# Patient Record
Sex: FEMALE | Race: WHITE | Employment: FULL TIME | URBAN - METROPOLITAN AREA
[De-identification: names, ages, dates, MRNs, and addresses within clinical notes are randomized per-mention and may not be internally consistent; named-entity substitution may affect disease eponyms.]

---

## 2021-05-04 ENCOUNTER — HOSPITAL ENCOUNTER (OUTPATIENT)
Dept: PREADMISSION TESTING | Age: 66
Discharge: HOME | End: 2021-05-04
Payer: COMMERCIAL

## 2021-05-04 ENCOUNTER — TRANSCRIBE ORDER (OUTPATIENT)
Dept: REGISTRATION | Age: 66
End: 2021-05-04

## 2021-05-04 DIAGNOSIS — D49.4 BLADDER NEOPLASM: ICD-10-CM

## 2021-05-04 DIAGNOSIS — D49.4 BLADDER NEOPLASM: Primary | ICD-10-CM

## 2021-05-04 LAB
ANION GAP SERPL CALC-SCNC: 6 MMOL/L (ref 3–18)
ATRIAL RATE: 68 BPM
BUN SERPL-MCNC: 18 MG/DL (ref 7–18)
BUN/CREAT SERPL: 23 (ref 12–20)
CALCIUM SERPL-MCNC: 9.4 MG/DL (ref 8.5–10.1)
CALCULATED P AXIS, ECG09: 80 DEGREES
CALCULATED R AXIS, ECG10: 68 DEGREES
CALCULATED T AXIS, ECG11: 61 DEGREES
CHLORIDE SERPL-SCNC: 110 MMOL/L (ref 100–111)
CO2 SERPL-SCNC: 26 MMOL/L (ref 21–32)
CREAT SERPL-MCNC: 0.79 MG/DL (ref 0.6–1.3)
DIAGNOSIS, 93000: NORMAL
ERYTHROCYTE [DISTWIDTH] IN BLOOD BY AUTOMATED COUNT: 12.4 % (ref 11.6–14.5)
GLUCOSE SERPL-MCNC: 100 MG/DL (ref 74–99)
HCT VFR BLD AUTO: 38.5 % (ref 35–45)
HGB BLD-MCNC: 12.9 G/DL (ref 12–16)
MCH RBC QN AUTO: 30.9 PG (ref 24–34)
MCHC RBC AUTO-ENTMCNC: 33.5 G/DL (ref 31–37)
MCV RBC AUTO: 92.3 FL (ref 74–97)
P-R INTERVAL, ECG05: 174 MS
PLATELET # BLD AUTO: 229 K/UL (ref 135–420)
PMV BLD AUTO: 9.3 FL (ref 9.2–11.8)
POTASSIUM SERPL-SCNC: 4.6 MMOL/L (ref 3.5–5.5)
Q-T INTERVAL, ECG07: 400 MS
QRS DURATION, ECG06: 86 MS
QTC CALCULATION (BEZET), ECG08: 425 MS
RBC # BLD AUTO: 4.17 M/UL (ref 4.2–5.3)
SODIUM SERPL-SCNC: 142 MMOL/L (ref 136–145)
VENTRICULAR RATE, ECG03: 68 BPM
WBC # BLD AUTO: 6.3 K/UL (ref 4.6–13.2)

## 2021-05-04 PROCEDURE — 80048 BASIC METABOLIC PNL TOTAL CA: CPT

## 2021-05-04 PROCEDURE — 93005 ELECTROCARDIOGRAM TRACING: CPT

## 2021-05-04 PROCEDURE — 85027 COMPLETE CBC AUTOMATED: CPT

## 2021-05-04 PROCEDURE — 36415 COLL VENOUS BLD VENIPUNCTURE: CPT

## 2021-05-07 ENCOUNTER — HOSPITAL ENCOUNTER (OUTPATIENT)
Dept: PREADMISSION TESTING | Age: 66
Discharge: HOME | End: 2021-05-07
Payer: COMMERCIAL

## 2021-05-07 PROCEDURE — U0005 INFEC AGEN DETEC AMPLI PROBE: HCPCS

## 2021-05-08 LAB — SARS-COV-2, COV2NT: NOT DETECTED

## 2021-05-12 ENCOUNTER — ANESTHESIA EVENT (OUTPATIENT)
Dept: SURGERY | Age: 66
End: 2021-05-12
Payer: COMMERCIAL

## 2021-05-13 ENCOUNTER — HOSPITAL ENCOUNTER (OUTPATIENT)
Age: 66
Setting detail: OBSERVATION
Discharge: HOME | End: 2021-05-14
Attending: UROLOGY | Admitting: UROLOGY
Payer: COMMERCIAL

## 2021-05-13 ENCOUNTER — ANESTHESIA (OUTPATIENT)
Dept: SURGERY | Age: 66
End: 2021-05-13
Payer: COMMERCIAL

## 2021-05-13 DIAGNOSIS — Z98.890 S/P BLADDER TUMOR EXCISION WITH FULGURATION: Primary | ICD-10-CM

## 2021-05-13 PROBLEM — R31.0 GROSS HEMATURIA: Status: ACTIVE | Noted: 2021-05-13

## 2021-05-13 PROCEDURE — 74011250636 HC RX REV CODE- 250/636: Performed by: UROLOGY

## 2021-05-13 PROCEDURE — 77030020782 HC GWN BAIR PAWS FLX 3M -B: Performed by: UROLOGY

## 2021-05-13 PROCEDURE — 77010033678 HC OXYGEN DAILY

## 2021-05-13 PROCEDURE — 74011000250 HC RX REV CODE- 250: Performed by: UROLOGY

## 2021-05-13 PROCEDURE — 76210000016 HC OR PH I REC 1 TO 1.5 HR: Performed by: UROLOGY

## 2021-05-13 PROCEDURE — 99218 HC RM OBSERVATION: CPT

## 2021-05-13 PROCEDURE — 74011000258 HC RX REV CODE- 258: Performed by: UROLOGY

## 2021-05-13 PROCEDURE — 77030005546 HC CATH URETH FOL 3W BARD -A: Performed by: UROLOGY

## 2021-05-13 PROCEDURE — 74011250636 HC RX REV CODE- 250/636: Performed by: NURSE ANESTHETIST, CERTIFIED REGISTERED

## 2021-05-13 PROCEDURE — 77030012508 HC MSK AIRWY LMA AMBU -A: Performed by: ANESTHESIOLOGY

## 2021-05-13 PROCEDURE — 2709999900 HC NON-CHARGEABLE SUPPLY: Performed by: UROLOGY

## 2021-05-13 PROCEDURE — 77030020268 HC MISC GENERAL SUPPLY: Performed by: UROLOGY

## 2021-05-13 PROCEDURE — 74011000254 HC RX REV CODE- 254: Performed by: NURSE ANESTHETIST, CERTIFIED REGISTERED

## 2021-05-13 PROCEDURE — 88305 TISSUE EXAM BY PATHOLOGIST: CPT

## 2021-05-13 PROCEDURE — 74011000250 HC RX REV CODE- 250: Performed by: ANESTHESIOLOGY

## 2021-05-13 PROCEDURE — 74011250637 HC RX REV CODE- 250/637: Performed by: UROLOGY

## 2021-05-13 PROCEDURE — 76060000033 HC ANESTHESIA 1 TO 1.5 HR: Performed by: UROLOGY

## 2021-05-13 PROCEDURE — 77030040361 HC SLV COMPR DVT MDII -B: Performed by: UROLOGY

## 2021-05-13 PROCEDURE — 88307 TISSUE EXAM BY PATHOLOGIST: CPT

## 2021-05-13 PROCEDURE — 74011000250 HC RX REV CODE- 250: Performed by: NURSE ANESTHETIST, CERTIFIED REGISTERED

## 2021-05-13 PROCEDURE — 76010000149 HC OR TIME 1 TO 1.5 HR: Performed by: UROLOGY

## 2021-05-13 RX ORDER — PROPOFOL 10 MG/ML
INJECTION, EMULSION INTRAVENOUS AS NEEDED
Status: DISCONTINUED | OUTPATIENT
Start: 2021-05-13 | End: 2021-05-13 | Stop reason: HOSPADM

## 2021-05-13 RX ORDER — ATROPA BELLADONNA AND OPIUM 16.2; 3 MG/1; MG/1
SUPPOSITORY RECTAL AS NEEDED
Status: DISCONTINUED | OUTPATIENT
Start: 2021-05-13 | End: 2021-05-13 | Stop reason: HOSPADM

## 2021-05-13 RX ORDER — HYDROMORPHONE HYDROCHLORIDE 1 MG/ML
0.5 INJECTION, SOLUTION INTRAMUSCULAR; INTRAVENOUS; SUBCUTANEOUS
Status: DISCONTINUED | OUTPATIENT
Start: 2021-05-13 | End: 2021-05-13 | Stop reason: HOSPADM

## 2021-05-13 RX ORDER — FUROSEMIDE 10 MG/ML
INJECTION INTRAMUSCULAR; INTRAVENOUS AS NEEDED
Status: DISCONTINUED | OUTPATIENT
Start: 2021-05-13 | End: 2021-05-13 | Stop reason: HOSPADM

## 2021-05-13 RX ORDER — OXYCODONE AND ACETAMINOPHEN 5; 325 MG/1; MG/1
1 TABLET ORAL AS NEEDED
Status: DISCONTINUED | OUTPATIENT
Start: 2021-05-13 | End: 2021-05-13 | Stop reason: HOSPADM

## 2021-05-13 RX ORDER — DEXAMETHASONE SODIUM PHOSPHATE 4 MG/ML
INJECTION, SOLUTION INTRA-ARTICULAR; INTRALESIONAL; INTRAMUSCULAR; INTRAVENOUS; SOFT TISSUE AS NEEDED
Status: DISCONTINUED | OUTPATIENT
Start: 2021-05-13 | End: 2021-05-13 | Stop reason: HOSPADM

## 2021-05-13 RX ORDER — SODIUM CHLORIDE, SODIUM LACTATE, POTASSIUM CHLORIDE, CALCIUM CHLORIDE 600; 310; 30; 20 MG/100ML; MG/100ML; MG/100ML; MG/100ML
1000 INJECTION, SOLUTION INTRAVENOUS CONTINUOUS
Status: DISCONTINUED | OUTPATIENT
Start: 2021-05-13 | End: 2021-05-13 | Stop reason: HOSPADM

## 2021-05-13 RX ORDER — DIPHENHYDRAMINE HYDROCHLORIDE 50 MG/ML
12.5 INJECTION, SOLUTION INTRAMUSCULAR; INTRAVENOUS
Status: DISCONTINUED | OUTPATIENT
Start: 2021-05-13 | End: 2021-05-13 | Stop reason: HOSPADM

## 2021-05-13 RX ORDER — LIDOCAINE HYDROCHLORIDE 20 MG/ML
INJECTION, SOLUTION EPIDURAL; INFILTRATION; INTRACAUDAL; PERINEURAL AS NEEDED
Status: DISCONTINUED | OUTPATIENT
Start: 2021-05-13 | End: 2021-05-13 | Stop reason: HOSPADM

## 2021-05-13 RX ORDER — HYDROMORPHONE HYDROCHLORIDE 1 MG/ML
INJECTION, SOLUTION INTRAMUSCULAR; INTRAVENOUS; SUBCUTANEOUS AS NEEDED
Status: DISCONTINUED | OUTPATIENT
Start: 2021-05-13 | End: 2021-05-13 | Stop reason: HOSPADM

## 2021-05-13 RX ORDER — ATROPA BELLADONNA AND OPIUM 16.2; 3 MG/1; MG/1
1 SUPPOSITORY RECTAL
Status: DISCONTINUED | OUTPATIENT
Start: 2021-05-13 | End: 2021-05-14 | Stop reason: HOSPADM

## 2021-05-13 RX ORDER — SODIUM CHLORIDE 0.9 % (FLUSH) 0.9 %
5-40 SYRINGE (ML) INJECTION EVERY 8 HOURS
Status: DISCONTINUED | OUTPATIENT
Start: 2021-05-13 | End: 2021-05-14 | Stop reason: HOSPADM

## 2021-05-13 RX ORDER — LABETALOL HYDROCHLORIDE 5 MG/ML
10 INJECTION, SOLUTION INTRAVENOUS ONCE
Status: COMPLETED | OUTPATIENT
Start: 2021-05-13 | End: 2021-05-13

## 2021-05-13 RX ORDER — MIDAZOLAM HYDROCHLORIDE 1 MG/ML
INJECTION, SOLUTION INTRAMUSCULAR; INTRAVENOUS AS NEEDED
Status: DISCONTINUED | OUTPATIENT
Start: 2021-05-13 | End: 2021-05-13 | Stop reason: HOSPADM

## 2021-05-13 RX ORDER — HYDROCODONE BITARTRATE AND ACETAMINOPHEN 5; 325 MG/1; MG/1
1 TABLET ORAL
Status: DISCONTINUED | OUTPATIENT
Start: 2021-05-13 | End: 2021-05-14 | Stop reason: HOSPADM

## 2021-05-13 RX ORDER — ONDANSETRON 2 MG/ML
INJECTION INTRAMUSCULAR; INTRAVENOUS AS NEEDED
Status: DISCONTINUED | OUTPATIENT
Start: 2021-05-13 | End: 2021-05-13 | Stop reason: HOSPADM

## 2021-05-13 RX ORDER — NALOXONE HYDROCHLORIDE 0.4 MG/ML
0.2 INJECTION, SOLUTION INTRAMUSCULAR; INTRAVENOUS; SUBCUTANEOUS AS NEEDED
Status: DISCONTINUED | OUTPATIENT
Start: 2021-05-13 | End: 2021-05-13 | Stop reason: HOSPADM

## 2021-05-13 RX ORDER — FENTANYL CITRATE 50 UG/ML
INJECTION, SOLUTION INTRAMUSCULAR; INTRAVENOUS AS NEEDED
Status: DISCONTINUED | OUTPATIENT
Start: 2021-05-13 | End: 2021-05-13 | Stop reason: HOSPADM

## 2021-05-13 RX ORDER — CITALOPRAM 20 MG/1
40 TABLET, FILM COATED ORAL EVERY EVENING
Status: DISCONTINUED | OUTPATIENT
Start: 2021-05-13 | End: 2021-05-14 | Stop reason: HOSPADM

## 2021-05-13 RX ORDER — ALBUTEROL SULFATE 0.83 MG/ML
2.5 SOLUTION RESPIRATORY (INHALATION) AS NEEDED
Status: DISCONTINUED | OUTPATIENT
Start: 2021-05-13 | End: 2021-05-13 | Stop reason: HOSPADM

## 2021-05-13 RX ORDER — LEVOFLOXACIN 500 MG/1
500 TABLET, FILM COATED ORAL
Status: DISCONTINUED | OUTPATIENT
Start: 2021-05-14 | End: 2021-05-14 | Stop reason: HOSPADM

## 2021-05-13 RX ORDER — DEXTROSE MONOHYDRATE AND SODIUM CHLORIDE 5; .45 G/100ML; G/100ML
75 INJECTION, SOLUTION INTRAVENOUS CONTINUOUS
Status: DISCONTINUED | OUTPATIENT
Start: 2021-05-13 | End: 2021-05-14 | Stop reason: HOSPADM

## 2021-05-13 RX ORDER — ESMOLOL HYDROCHLORIDE 10 MG/ML
INJECTION INTRAVENOUS AS NEEDED
Status: DISCONTINUED | OUTPATIENT
Start: 2021-05-13 | End: 2021-05-13 | Stop reason: HOSPADM

## 2021-05-13 RX ORDER — FENTANYL CITRATE 50 UG/ML
25 INJECTION, SOLUTION INTRAMUSCULAR; INTRAVENOUS AS NEEDED
Status: DISCONTINUED | OUTPATIENT
Start: 2021-05-13 | End: 2021-05-13 | Stop reason: HOSPADM

## 2021-05-13 RX ORDER — LEVOFLOXACIN 5 MG/ML
500 INJECTION, SOLUTION INTRAVENOUS ONCE
Status: COMPLETED | OUTPATIENT
Start: 2021-05-13 | End: 2021-05-13

## 2021-05-13 RX ORDER — GLYCOPYRROLATE 0.2 MG/ML
INJECTION INTRAMUSCULAR; INTRAVENOUS AS NEEDED
Status: DISCONTINUED | OUTPATIENT
Start: 2021-05-13 | End: 2021-05-13 | Stop reason: HOSPADM

## 2021-05-13 RX ORDER — FLUMAZENIL 0.1 MG/ML
0.2 INJECTION INTRAVENOUS
Status: DISCONTINUED | OUTPATIENT
Start: 2021-05-13 | End: 2021-05-13 | Stop reason: HOSPADM

## 2021-05-13 RX ORDER — SODIUM CHLORIDE, SODIUM LACTATE, POTASSIUM CHLORIDE, CALCIUM CHLORIDE 600; 310; 30; 20 MG/100ML; MG/100ML; MG/100ML; MG/100ML
125 INJECTION, SOLUTION INTRAVENOUS CONTINUOUS
Status: DISCONTINUED | OUTPATIENT
Start: 2021-05-13 | End: 2021-05-14 | Stop reason: HOSPADM

## 2021-05-13 RX ORDER — SODIUM CHLORIDE 0.9 % (FLUSH) 0.9 %
5-40 SYRINGE (ML) INJECTION AS NEEDED
Status: DISCONTINUED | OUTPATIENT
Start: 2021-05-13 | End: 2021-05-14 | Stop reason: HOSPADM

## 2021-05-13 RX ORDER — ROCURONIUM BROMIDE 10 MG/ML
INJECTION, SOLUTION INTRAVENOUS AS NEEDED
Status: DISCONTINUED | OUTPATIENT
Start: 2021-05-13 | End: 2021-05-13 | Stop reason: HOSPADM

## 2021-05-13 RX ADMIN — SODIUM CHLORIDE, SODIUM LACTATE, POTASSIUM CHLORIDE, AND CALCIUM CHLORIDE: 600; 310; 30; 20 INJECTION, SOLUTION INTRAVENOUS at 09:40

## 2021-05-13 RX ADMIN — HYDROCODONE BITARTRATE AND ACETAMINOPHEN 1 TABLET: 5; 325 TABLET ORAL at 16:01

## 2021-05-13 RX ADMIN — HYDROMORPHONE HYDROCHLORIDE 0.5 MG: 1 INJECTION, SOLUTION INTRAMUSCULAR; INTRAVENOUS; SUBCUTANEOUS at 09:09

## 2021-05-13 RX ADMIN — ROCURONIUM BROMIDE 15 MG: 10 INJECTION, SOLUTION INTRAVENOUS at 09:09

## 2021-05-13 RX ADMIN — GEMCITABINE 1000 MG: 38 INJECTION, SOLUTION INTRAVENOUS at 09:42

## 2021-05-13 RX ADMIN — LABETALOL HYDROCHLORIDE 10 MG: 5 INJECTION INTRAVENOUS at 10:59

## 2021-05-13 RX ADMIN — ESMOLOL HYDROCHLORIDE 30 MG: 10 INJECTION, SOLUTION INTRAVENOUS at 09:17

## 2021-05-13 RX ADMIN — SUGAMMADEX 125 MG: 100 INJECTION, SOLUTION INTRAVENOUS at 09:39

## 2021-05-13 RX ADMIN — ROCURONIUM BROMIDE 15 MG: 10 INJECTION, SOLUTION INTRAVENOUS at 09:16

## 2021-05-13 RX ADMIN — DEXTROSE MONOHYDRATE AND SODIUM CHLORIDE 75 ML/HR: 5; .45 INJECTION, SOLUTION INTRAVENOUS at 12:22

## 2021-05-13 RX ADMIN — GLYCOPYRROLATE 0.1 MG: 0.2 INJECTION INTRAMUSCULAR; INTRAVENOUS at 08:47

## 2021-05-13 RX ADMIN — ONDANSETRON HYDROCHLORIDE 4 MG: 2 INJECTION INTRAMUSCULAR; INTRAVENOUS at 08:55

## 2021-05-13 RX ADMIN — PROPOFOL 100 MG: 10 INJECTION, EMULSION INTRAVENOUS at 08:52

## 2021-05-13 RX ADMIN — FUROSEMIDE 10 MG: 10 INJECTION, SOLUTION INTRAVENOUS at 09:29

## 2021-05-13 RX ADMIN — LIDOCAINE HYDROCHLORIDE 100 MG: 20 INJECTION, SOLUTION EPIDURAL; INFILTRATION; INTRACAUDAL; PERINEURAL at 08:52

## 2021-05-13 RX ADMIN — FENTANYL CITRATE 100 MCG: 50 INJECTION, SOLUTION INTRAMUSCULAR; INTRAVENOUS at 09:14

## 2021-05-13 RX ADMIN — SODIUM CHLORIDE, SODIUM LACTATE, POTASSIUM CHLORIDE, AND CALCIUM CHLORIDE 125 ML/HR: 600; 310; 30; 20 INJECTION, SOLUTION INTRAVENOUS at 07:40

## 2021-05-13 RX ADMIN — INDIGO CARMINE 40 MG: 8 INJECTION, SOLUTION INTRAMUSCULAR; INTRAVENOUS at 09:09

## 2021-05-13 RX ADMIN — MIDAZOLAM 2 MG: 1 INJECTION INTRAMUSCULAR; INTRAVENOUS at 08:47

## 2021-05-13 RX ADMIN — DEXAMETHASONE SODIUM PHOSPHATE 8 MG: 4 INJECTION, SOLUTION INTRAMUSCULAR; INTRAVENOUS at 08:55

## 2021-05-13 RX ADMIN — LEVOFLOXACIN 500 MG: 5 INJECTION, SOLUTION INTRAVENOUS at 08:49

## 2021-05-13 RX ADMIN — CITALOPRAM HYDROBROMIDE 40 MG: 20 TABLET ORAL at 18:02

## 2021-05-13 RX ADMIN — HYDROMORPHONE HYDROCHLORIDE 0.5 MG: 1 INJECTION, SOLUTION INTRAMUSCULAR; INTRAVENOUS; SUBCUTANEOUS at 08:47

## 2021-05-13 NOTE — PROGRESS NOTES
2400 [facility] verifies that Dr Herve Chopra states pt did not need CBI at this time But wants to have bags ready in pt room just in case pt needs. [facility] care of pt at this time. Assessment complete. Pt alert and oriented x 4. Shows no sign of distress. Fall risk arm band in place. Denies SOB and chest pain. Pt lungs clear bilaterally. Cap refill  less than 3 seconds. Pt denies numbness and tingling to all extremities. Stated pain 0/10. Pt has 20 G IV to L hand. Pt has no dressing skin intact besides scratches to arm from dog . Lamb in place draining clear yellow urine free of clots. Pt complains of no discomfort except urge to void due to catheter. SCDS and TEDs applied to BLE. Incentive spirometer at bedside. Pt encouraged to continue use of IS. Pt verbalized understanding. Call light and possessions within reach. Bed locked and in low position. Will continue to monitor. 1437  Lamb bag emptied 350ml of clear yellow urine free from clots no blood noted. Pt still states she is \"doing well\" no pain. Nurse will continue to monitor     1601  Pt complains of Left flank pain states she had UTI in past. Nurse explained that pt will receive abx and if has not gotten any better will inform Dr Herve Chopra. Pt states pain is 5/10 medicated with 5mg of norco.  Urine remains clear yellow. No blood or clots noted    Shift summary  Pt is alert and oriented x 4. Pt had uneventful shift. Pt  voiding sufficient amounts of clear yellow urine via lamb. Denies discomfort or feeling of having bladder spasms. Pain to be controlled by PRN medication.

## 2021-05-13 NOTE — INTERVAL H&P NOTE
Update History & Physical    The Patient's History and Physical of April 20, 2021 was reviewed with the patient and I examined the patient. There was no change. The surgical site was confirmed by the patient and me. Plan:  The risk, benefits, expected outcome, and alternative to the recommended procedure have been discussed with the patient. Patient understands and wants to proceed with the procedure.     Electronically signed by Sofie Clemente MD on 5/13/2021 at 8:38 AM

## 2021-05-13 NOTE — ANESTHESIA POSTPROCEDURE EVALUATION
Post-Anesthesia Evaluation and Assessment    Cardiovascular Function/Vital Signs  Visit Vitals  BP (!) 154/66   Pulse 71   Temp 37.3 °C (99.1 °F)   Resp 20   Ht 5' 3.5\" (1.613 m)   Wt 62.6 kg (138 lb 1 oz)   SpO2 95%   BMI 24.07 kg/m²       Patient is status post Procedure(s):  CYSTOSCOPY TRANSURETHRAL RESECTION OF BLADDER TUMOR WITH GEMCITABINE. Nausea/Vomiting: Controlled. Postoperative hydration reviewed and adequate. Pain:  Pain Scale 1: FLACC (05/13/21 1113)  Pain Intensity 1: 0 (05/13/21 1113)   Managed. Neurological Status:   Neuro (WDL): Within Defined Limits (05/13/21 1030)   At baseline. Mental Status and Level of Consciousness: Baseline and appropriate for discharge. Pulmonary Status:   O2 Device: None (Room air) (05/13/21 1010)   Adequate oxygenation and airway patent. Complications related to anesthesia: None    Post-anesthesia assessment completed. No concerns. Patient has met all discharge requirements.     Signed By: An Pelaez MD    May 13, 2021

## 2021-05-13 NOTE — OP NOTES
Postoperative Note    Patient: Sander Cook  YOB: 1955  MRN: [id number]    Date of Procedure: 5/13/2021     Pre-Op Diagnosis: NEOPLASM UNSPECIFIED  BEHAVIOR OF BLADDER    Post-Op Diagnosis: Same as preoperative diagnosis. Procedure(s):  CYSTOSCOPY TRANSURETHRAL RESECTION OF BLADDER TUMOR LARGE WITH GEMCITABINE    Surgeon(s):  Cornel Angelucci, MD    Surgical Assistant: None    Anesthesia: General     Estimated Blood Loss (mL): Minimal    Complications: None    Specimens:   ID Type Source Tests Collected by Time Destination   1 : BLADDER TUMOR RESECTION Preservative Bladder  Cornel Angelucci, MD 5/13/2021 1154 Pathology        Implants: * No implants in log *    Drains: * No LDAs found *    Findings: Large tumor occupying left side of trigone extending to left sidewall, unable to identify the left ureteral orifice, mass palpable and mobile on preop exam under anesthesia    Procedure:    Patient was brought in the operating and placed in the supine position. After administration of general anesthesia she was placed in the lithotomy position. I began by performing a bimanual exam.  I was able to palpate mass on the left side of the bladder. The bladder was mobile. No other abnormalities were identified. I began with a 21 Sen[place]e cystoscope, using 30 degree and 70 degree lens the tumor was clearly identified on the left side of the bladder. I was unable to identify either ureteral orifice at this time due to some bleeding of the tumors. I removed the cystoscope and placed in a 26 Seneg[place]e resectoscope. Using the bipolar loop I began resecting the tumor on the left side of the trigone and laterally. I gave the patient indigo carmine to hopefully identify both ureteral orifice ease. I continued to resect the tumor down to muscle. I pinpoint cauterized during the entire procedure to prevent arising the ureteral orifice on the left side. I was able to see blue from the right ureteral orifice.   However once the case was completed I could not identify the ureteral orifice on the left side. At this point I was able to enter the bladder all the specimen was removed. There was no active bleeding. No residual tumor identified. I continue to see blue urine from the right ureteral orifice, no urine from the left side was identified. I placed a 22 Portuguese three-way Montalvo catheter into the bladder. The influx valve was plugged with a catheter plug. I then instilled 2 g of gemcitabine into the bladder. The bladder was then clamped. Patient was then extubated and transferred to recovery room in stable condition.

## 2021-05-13 NOTE — PERIOP NOTES
Reviewed PTA medication list with patient/caregiver and patient/caregiver denies any additional medications. Patient admits to having a responsible adult care for them at home for at least 24 hours after surgery. Patient encouraged to use gown warming system and informed that using said warming gown to regulate body temperature prior to a procedure has been shown to help reduce the risks of blood clots and infection. Patient's pharmacy of choice verified and documented in PTA medication section. Dual skin assessment & fall risk band verification completed with Isa FELDMAN.

## 2021-05-13 NOTE — ANESTHESIA PREPROCEDURE EVALUATION
Relevant Problems   No relevant active problems       Anesthetic History   No history of anesthetic complications            Review of Systems / Medical History  Patient summary reviewed, nursing notes reviewed and pertinent labs reviewed    Pulmonary          Smoker      Comments: 1/2 ppd x 15y; abstained today   Neuro/Psych   Within defined limits           Cardiovascular                  Exercise tolerance: >4 METS     GI/Hepatic/Renal  Within defined limits              Endo/Other        Arthritis     Other Findings              Physical Exam    Airway  Mallampati: II  TM Distance: 4 - 6 cm  Neck ROM: normal range of motion   Mouth opening: Normal     Cardiovascular               Dental  No notable dental hx       Pulmonary                 Abdominal         Other Findings            Anesthetic Plan    ASA: 2  Anesthesia type: general          Induction: Intravenous  Anesthetic plan and risks discussed with: Patient

## 2021-05-13 NOTE — PERIOP NOTES
TRANSFER - OUT REPORT:    Verbal report given to [facility]  RN (name) on Sissy Stone  being transferred to 2 S(unit) for routine progression of care       Report consisted of patients Situation, Background, Assessment and   Recommendations(SBAR). Information from the following report(s) SBAR, Kardex, OR Summary, Procedure Summary, Intake/Output and MAR was reviewed with the receiving nurse. Lines:   Peripheral IV 05/13/21 Left Hand (Active)   Site Assessment Clean, dry, & intact 05/13/21 0736   Phlebitis Assessment 0 05/13/21 0736   Infiltration Assessment 0 05/13/21 0736   Dressing Status Clean, dry, & intact 05/13/21 0736   Dressing Type Transparent;Tape 05/13/21 0736   Hub Color/Line Status Infusing;Patent;Pink 05/13/21 0736        Intake/Output Summary (Last 24 hours) at 5/13/2021 1138  Last data filed at 5/13/2021 1126  Gross per 24 hour   Intake 1600 ml   Output 675 ml   Net 925 ml         Opportunity for questions and clarification was provided.       Patient transported with:   O2 @ 2 liters  Registered Nurse

## 2021-05-13 NOTE — H&P
Urology [place] [id number]  Tel: [phone]  Fax: [phone]    Patient : Herminio Cochran   YOB: 1955   Birth Sex: Female      Current Gender: Female      Date:  04/20/2021 10:30 AM    Visit Type:   Office Visit   Assessment/Plan  # Detail Type Description    1. Assessment Gross hematuria (R31.0). Patient Plan Patient with history of gross hematuria at this time her hematuria appears to be due to the mass at the base of her bladder         2. Assessment Cigarette smoker (F17.210). Patient Plan Patient continues to smoke I strongly encouraged her to stop smoking as soon as possible since that will decrease the chance of recurrence disease of disease         3. 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 Patient Plan Patient underwent cystoscopy today. Findings revealed bladder mass. I reviewed these findings with the patient along with her CT scan and her urine cytology. At this time recommend she undergo cystoscopy transurethral resection of bladder tumor and instillation of gemcitabine. All risks including pain infection bleeding bladder injury recurrence of disease need for 2nd treatment on this tumor to the size were reviewed she understands will proceed         4. Assessment Personal history of kidney stones (B84.429). Patient Plan Patient with history of kidney stones was treated in the 1990s at [facility]. She underwent ESWL at that time with apparent resolution of stones              Additional Visit Information      This 76year old female presents for Hematuria. History of Present Illness  1. Hematuria   The patient presents with hematuria (gross with clots). The problem began suddenly and occurs every 3-6 months. Pertinent history includes being at least 35years of age, history of kidney stones and smoking. Denies aggravating factors. Denies relieving factors.  The patient denies chills, fever, nausea, pressure, urinary frequency, urgency and vomiting. Additional information: Pt with hx of Gross hematuria, this began in January. She was tx'ed w multiple antibiotics but still w hematuria. Pt w hx of stones in  had ESWL at [facility]. She is still a daily smoker. She is having some LEFT flank pain. 2021  Patient here today for follow-up she is here for cystoscopy to complete hematuria workup. Her urine cytology was positive for fish. Her CT scan revealed:  1. 3.6 x 4.4 x 2.5 cm enhancing mass consistent with primary urothelial neoplasm of the   urinary bladder. This is at the left posterolateral aspect of the urinary bladder and   involving the left ureterovesical junction with abnormal enhancing soft tissue at the   distal most left ureter also present. 2. The above is associated with marked chronic atrophy of the left kidney with extensive   multifocal chronic left renal cortical parenchymal scarring and regions of decreased   cortical enhancement. 3. Likely compensatory enlargement of the right kidney, however there are some regions of   right renal cortical parenchymal scarring. 4. Mild caliectasis at the lower pole of the right kidney without significant right   hydroureteronephrosis. This may be baseline for the patient. 5. Scattered small benign hepatic cysts. Past Medical/Surgical History   (Reviewed, updated)  Disease/disorder Onset Date Management Date Comments      x 3       kidney stent         Problem List  Problem List reviewed.    Problem Description Onset Date Chronic Clinical Status Notes   Hyperlipidemia 2014 Y     Psoriasis 2014 Y     Tobacco dependence syndrome 2014 Y     Depressive disorder 2014 Y     Infantile cerebral palsy 2014 Y       Medications (active prior to today)  Medication Instructions Start Date Stop Date Refilled Elsewhere   Celexa 40 mg tablet take 1 tablet by oral route  every day 2014   N triamcinolone acetonide 0.1 % topical cream apply by topical route 2 times every day a thin layer to the affected area(s) 2014  N       Medication Reconciliation  Medications reconciled today. Medication Reviewed  Adherence Medication Name Sig Desc Elsewhere Status   taking as directed Celexa 40 mg tablet take 1 tablet by oral route  every day N Verified     Allergies  Ingredient Reaction (Severity) Medication Name Comment   NO KNOWN ALLERGIES        Reviewed, no changes. Family History   (Reviewed, updated)    Relationship Family Member Name  Age at Death Condition Onset Age Cause of Death       Family history of Heart disease  N   Father  N  Myocardial infarction  N   Mother  N  Alzheimer's Disease  N   Family History Comments  Relationship Family Member Name Condition Comments   Father  Myocardial infarction    Mother  Alzheimer's Disease      Social History  (Reviewed, updated)  Tobacco use reviewed. Preferred language is [place]. Marital Status/Family/Social Support  Marital status:    Previously  one time. Children  Has children:    Tobacco use status: Moderate cigarette smoker (10-19 cigs/day). Smoking status: Heavy tobacco smoker. Tobacco Screening  Patient has used tobacco.     Smoking Status  Type Smoking Status Usage Per Day Years Used Pack Years Total Pack Years   Cigarette Heavy tobacco smoker 10 Cigarettes 30.00 15.00 15.00     Alcohol  There is a history of alcohol use. consumed rarely. Caffeine  The patient uses caffeine: coffee - 2 cups a day. Review of Systems  System Neg/Pos Details   Constitutional Negative Chills and Fever. ENMT Negative Ear infections and Sore throat. Eyes Negative Blurred vision, Double vision and Eye pain. Respiratory Negative Asthma, Chronic cough, Dyspnea and Wheezing. Cardio Negative Chest pain. GI Negative Constipation, Decreased appetite, Diarrhea, Nausea and Vomiting. Endocrine Negative Cold intolerance, Heat intolerance, Increased thirst and Weight loss. Neuro Negative Headache and Tremors. Psych Negative Anxiety and Depression. Integumentary Negative Itching skin and Rash. MS Negative Back pain and Joint pain. Hema/Lymph Negative Easy bleeding. Vital Signs   Height  Time ft in cm Last Measured Height Position   11:05 AM 5.0 3.50 161.29 04/06/2021 0     Weight/BSA/BMI  Time lb oz kg Context BMI kg/m2 BSA m2   11:05 .00  61.235  23.54      Measured by  Time Measured by   11:05 AM [facility]     Physical Exam  Exam Findings Details   Constitutional Normal Well developed. Neck Exam Normal Inspection - Normal.   Respiratory Normal Inspection - Normal.   Genitourinary Normal External genitalia - Normal. Glands - Normal.   Extremity Normal No Edema. Psychiatric Normal Orientation - Oriented to time, place, person & situation. Appropriate mood and affect. Cystoscopy indication  Bladder. Patient consent  Consent was obtained. The procedure and risks were explained in detail. Questions were encouraged and answered. The patient was prepped and draped in the usual sterile fashion. Procedure  A diagnostic cystourethroscopy was performed using a 16 Chilean flexible cystoscope    Anesthesia  No anesthesia. Patient position  Dorsal lithotomy. Patient response  Patient tolerated procedure well. Patient was given instructions. Patient was discharged in stable condition. Findings   The bladder has lesion/mass found on the bladder. The first lesion/mass is located Bladder base of the bladder with characteristics of papillary. Ureteral orifices normal in appearance. Antibiotics  No antibiotics given. Cipro    Impression  Gross hematuria R31.0.         Medications (added, continued, or stopped today)  Start Date Medication Directions PRN Status PRN Reason Instruction Stop Date   02/28/2014 Celexa 40 mg tablet take 1 tablet by oral route  every day N      02/28/2014 triamcinolone acetonide 0.1 % topical cream apply by topical route 2 times every day a thin layer to the affected area(s) N   04/20/2021       Active Patient Care Team Members  Name Contact Agency Type Support Role Relationship Active Date Inactive Date Specialty   Leeanna Spangler   Patient provider PCP      [place]   Emergency Contact Child, Mother is the Patient          Provider:    Jayjay Ball MD 04/24/2021 8:09 AM     Document generated by:  Rodger Brizuela 04/24/2021 08:09 AM      ----------------------------------------------------------------------------------------------------------------------------------------------------------------------      Electronically signed by Jayjay Ball MD on 04/25/2021 08:10 PM

## 2021-05-13 NOTE — ROUTINE PROCESS
TRANSFER - IN REPORT:    Verbal report received from DINAH Rooney RN(name) on Ressie Eisenmenger  being received from [facility]) for routine post - op      Report consisted of patients Situation, Background, Assessment and   Recommendations(SBAR). Information from the following report(s) SBAR, Kardex, [facility] and Recent Results was reviewed with the receiving nurse. Opportunity for questions and clarification was provided. Assessment completed upon patients arrival to unit and care assumed.

## 2021-05-13 NOTE — BRIEF OP NOTE
Brief Postoperative Note    Patient: Sherrill Brantley  YOB: 1955  MRN: [id number]    Date of Procedure: 5/13/2021     Pre-Op Diagnosis: NEOPLASM UNSPECIFIED  BEHAVIOR OF BLADDER    Post-Op Diagnosis: Same as preoperative diagnosis.       Procedure(s):  CYSTOSCOPY TRANSURETHRAL RESECTION OF BLADDER TUMOR LARGE WITH GEMCITABINE    Surgeon(s):  Pablo Hawkins MD    Surgical Assistant: None    Anesthesia: General     Estimated Blood Loss (mL): Minimal    Complications: None    Specimens:   ID Type Source Tests Collected by Time Destination   1 : BLADDER TUMOR RESECTION Preservative Bladder  Pablo Hawkins MD 5/13/2021 0230 Pathology        Implants: * No implants in log *    Drains: * No LDAs found *    Findings: Large tumor occupying left side of trigone extending to left sidewall, unable to identify the left ureteral orifice, mass palpable and mobile on preop exam under anesthesia    Electronically Signed by Lj Mcdonald MD on 5/13/2021 at 9:57 AM

## 2021-05-13 NOTE — ROUTINE PROCESS
Bedside and Verbal shift change report given to Gema Rivers (oncoming nurse) by Diana Akins RN (offgoing nurse). Report included the following information SBAR, Kardex, MAR and Recent Results.

## 2021-05-14 VITALS
RESPIRATION RATE: 16 BRPM | HEART RATE: 65 BPM | BODY MASS INDEX: 23.57 KG/M2 | OXYGEN SATURATION: 96 % | DIASTOLIC BLOOD PRESSURE: 70 MMHG | HEIGHT: 64 IN | SYSTOLIC BLOOD PRESSURE: 154 MMHG | TEMPERATURE: 98.2 F | WEIGHT: 138.06 LBS

## 2021-05-14 PROCEDURE — 74011250637 HC RX REV CODE- 250/637: Performed by: UROLOGY

## 2021-05-14 PROCEDURE — 99218 HC RM OBSERVATION: CPT

## 2021-05-14 RX ORDER — HYDROCODONE BITARTRATE AND ACETAMINOPHEN 5; 325 MG/1; MG/1
1 TABLET ORAL
Qty: 24 TAB | Refills: 0 | Status: SHIPPED | OUTPATIENT
Start: 2021-05-14 | End: 2021-10-13

## 2021-05-14 RX ADMIN — HYDROCODONE BITARTRATE AND ACETAMINOPHEN 1 TABLET: 5; 325 TABLET ORAL at 03:05

## 2021-05-14 RX ADMIN — HYDROCODONE BITARTRATE AND ACETAMINOPHEN 1 TABLET: 5; 325 TABLET ORAL at 09:14

## 2021-05-14 RX ADMIN — LEVOFLOXACIN 500 MG: 500 TABLET, FILM COATED ORAL at 06:50

## 2021-05-14 NOTE — PROGRESS NOTES
2190 - Bedside shift report received from K. Pleasant Babinski, RN. Assumed care of patient. Patient noted resting in bed at this time. Call light in reach. 8244 - Assessment completed. Patient alert and oriented x4. Respirations even and unlabored. Denies any SOB/chest pain. Abdomen soft and nontender. Tolerated breakfast well. Denies any nausea/vomiting. Patient ambulated to bathroom and voided 150 ml of bloody tinged urine. PRN Norco administered for lower back/flank pain. Patient returned to edge of bed. Call light in reach. 65 - Dr. Junior Claros (on-call) paged to clarify if patient is being discharged on keflex. Discharge instruction indicate that patient to take keflex, but only Norco prescription sent to pharmacy. Awaiting return call. 18 - Dr. Junior Claros notified and will check into it. 12 - Dr. Junior Claros returned call and stated keflex was sent to patient's home pharmacy.

## 2021-05-14 NOTE — PROGRESS NOTES
Urology Progress Note    Patient: Sherrill Brantley MRN: [id number] SSN: [id number]    YOB: 1955  Age: 76 y.o. Sex: female    DOA: 2021 LOS:  LOS: 0 days              Subjective:   Pt doiing very well this morning, has had a little LEFT flank pain. I was unable to identify the LEFT UO during the procedure. She took one Norco and pain resolved    Objective:      Visit Vitals  BP (!) 136/59   Pulse 63   Temp 97.8 °F (36.6 °C)   Resp 16   Ht 5' 3.5\" (1.613 m)   Wt 62.6 kg (138 lb 1 oz)   SpO2 96%   BMI 24.07 kg/m²     Temp (24hrs), Av.8 °F (36.6 °C), Min:97.3 °F (36.3 °C), Max:99.1 °F (37.3 °C)      Intake and Output:   0701 -  1900  In: 1600 [I.V.:1600]  Out: 1900 [VNLSF:0318]  1901 -  0700  In: 300 [I.V.:300]  Out: 1000 [Urine:1000]    Lamb: clear    Medications Reviewed. Assessment/Plan:   Active Problems:    Gross hematuria (2021)        Status Post:  Procedure(s):  CYSTOSCOPY TRANSURETHRAL RESECTION OF BLADDER TUMOR WITH GEMCITABINE   Impression: POD #1 s/p TURBT large left sided bladder tumor. She has some mild LEFT flank pain controlled w Norco x 1. Probably due to swelling of LEFT UO    Plan:  1. D/C lamb: done  2. D/C home after voiding and tolerating PO  3. Change Tramadol to Norco since that helped her flank pain  4.  F/U me 2021 my office will contact pt to arrange time    Lj Mcdonald MD  May 14, 2021

## 2021-05-14 NOTE — ROUTINE PROCESS
Dual AVS reviewed with Kaushik Nelson RN. All medications reviewed individually with patient. Opportunities for questions and concerns provided. Patient verbalized understanding and verified by teachback. IV discontinued, no redness, swelling or pain noted. Patient discharged via (mode of transport ie. Car, ambulance or air transport) car. Patient's arm band appropriately discarded.

## 2021-05-14 NOTE — PROGRESS NOTES
Problem: Falls - Risk of  Goal: *Absence of Falls  Description: Document Erich Sanchez Fall Risk and appropriate interventions in the flowsheet.   Outcome: Resolved/Met     Problem: Patient Education: Go to Patient Education Activity  Goal: Patient/Family Education  Outcome: Resolved/Met     Problem: Pain  Goal: *Control of Pain  Outcome: Resolved/Met     Problem: Patient Education: Go to Patient Education Activity  Goal: Patient/Family Education  Outcome: Resolved/Met     Problem: Patient Education: Go to Patient Education Activity  Goal: Patient/Family Education  Outcome: Resolved/Met     Problem: Surgical Pathway Day of Surgery  Goal: Consults, if ordered  Outcome: Resolved/Met  Goal: Medications  Outcome: Resolved/Met  Goal: Respiratory  Outcome: Resolved/Met

## 2021-05-14 NOTE — PROGRESS NOTES
21:05 Assessment completed. Lungs are clear bilat. Montalvo is patent with clear yellow urine with 0 clots noted. 22:45 Shift assessment completed. See nsg flow sheet for details. 03:00 Reassessed with 0 changes noted. C/o L flank discomfort, medicated PRN. Montalvo remains patent with clear,yellow urine. 0 bleeding or clots observed. 06:40 Declining to get up & sit in the chair @ bedside. 07:30 Bedside and Verbal shift change report given to DINAH Roy RN (oncoming nurse) by Peace Stoll RN (offgoing nurse). Report included the following information SBAR.

## 2021-05-14 NOTE — DISCHARGE INSTRUCTIONS
Maliha Lovell. Delores Hui M.D. [facility]  [address], [address] [address] Venegas, [place] [facility]  Office: [phone]  Fax:    [phone]    PROCEDURE: Procedure(s):  [place]    Notify [facility] IMMEDIATELY if any of the following occur:     You are unable to urinate. Urgency to urinate is not uncommon.  You find yourself urinating small frequent amounts associated with severe lower abdominal discomfort.  Bright red blood with clots in the urine. Some reddish urine is not uncommon and should be treated with increasing the amount of fluids you drink.  Temperature above 101.5° and / or chills.  You are nauseous and / or vomiting and you cannot hold down any fluids.  Your pain is not controlled with the pain medication prescribed. Special Considerations:      Do not drive for at least 24 hours after the procedure and until you are no longer taking narcotic pain medication and you are able to move and react without hesitation. MEDICATIONS:  Pain   [x]  Norco®   []  Percocet® []  Dilaudid®    []  Tramadol   Antibiotics   []  Cipro   [x]  Keflex    [] Levaquin   []  Bactrim DS®       Urination   []  Vesicare®   []  Flomax     Burning   []  Pyridium®   []  UribelTM     Nausea   []  Zofran®   []  Phenergan®     Miscellaneous   []           [] Prescriptions Written on Chart    [x] Prescriptions sent Electronically           Our office will call you tomorrow to schedule your first follow-up appointment. Please contact [facility]. Urology at [phone] or go to the nearest Emergency Department / Urgent Care facility for any other medical questions or concerns.

## 2021-05-14 NOTE — PROGRESS NOTES
Problem: Falls - Risk of  Goal: *Absence of Falls  Description: Document [place] Fall Risk and appropriate interventions in the flowsheet.   Outcome: Progressing Towards Goal  Note: Fall Risk Interventions:            Medication Interventions: Assess postural VS orthostatic hypotension, Patient to call before getting OOB, Teach patient to arise slowly    Elimination Interventions: Call light in reach, Patient to call for help with toileting needs

## 2021-05-14 NOTE — DISCHARGE SUMMARY
Discharge Summary     Patient ID:  Quique Gamboa  [id number]   58 y.o.  1955    Admit date: 5/13/2021    Discharge Date: 5/14/2021      Admitting Physician: Benjamin Nguyen MD     Discharge Physician: Benjamin Nguyen MD    Admission Diagnoses: Gross hematuria [R31.0]    Last Procedure: Procedure(s):  CYSTOSCOPY TRANSURETHRAL RESECTION OF BLADDER TUMOR WITH GEMCITABINE    Discharge Diagnoses: Active Problems:    Gross hematuria (5/13/2021)         Discharge Condition: stable    Consults: None    Significant Diagnostic Studies: none     Hospital Course:   Normal hospital course for this procedure. Disposition: Home    Patient Instructions:   Current Discharge Medication List      START taking these medications    Details   HYDROcodone-acetaminophen (NORCO) 5-325 mg per tablet Take 1 Tab by mouth every four (4) hours as needed for Pain for up to 7 days. Max Daily Amount: 6 Tabs. Qty: 24 Tab, Refills: 0    Associated Diagnoses: S/P bladder tumor excision with fulguration         CONTINUE these medications which have NOT CHANGED    Details   citalopram (CELEXA) 40 mg tablet Take 40 mg by mouth every evening. Diet: As per pre op    Activity: Shoower today, no heavy lifting x 2 weeks no more than 10 lbs    Follow-up Appointments   Procedures    FOLLOW UP VISIT Appointment in: Other (Specify) My office will contact pt     My office will contact pt     Standing Status:   Standing     Number of Occurrences:   1     Order Specific Question:   Appointment in     Answer:    Other (Specify)          Signed:  Benjamin Nguyen MD  May 14, 2021  6:26 AM

## (undated) DEVICE — STERILE POLYISOPRENE POWDER-FREE SURGICAL GLOVES: Brand: PROTEXIS

## (undated) DEVICE — REM POLYHESIVE ADULT PATIENT RETURN ELECTRODE: Brand: VALLEYLAB

## (undated) DEVICE — DEVICE SECUREMENT 1/32IN POLYETH FOAM F ANCHR URIN CATH

## (undated) DEVICE — MARKER,SKIN,WI/RULER AND LABELS: Brand: MEDLINE

## (undated) DEVICE — Device

## (undated) DEVICE — SPONGE GZ W4XL4IN COT 12 PLY TYP VII WVN C FLD DSGN

## (undated) DEVICE — SOLUTION IRRIG 3000ML H2O STRL BAG

## (undated) DEVICE — CATHETER URETH 22FR 30CC BLLN F 3 W SPEC M RND TIP TWO

## (undated) DEVICE — PAD,NON-ADHERENT,3X8,STERILE,LF,1/PK: Brand: MEDLINE

## (undated) DEVICE — TUBING, SUCTION, 1/4" X 12', STRAIGHT: Brand: MEDLINE

## (undated) DEVICE — GARMENT,MEDLINE,DVT,INT,CALF,MED, GEN2: Brand: MEDLINE

## (undated) DEVICE — STRAP,POSITIONING,KNEE/BODY,FOAM,4X60": Brand: MEDLINE

## (undated) DEVICE — CYSTO PACK: Brand: MEDLINE INDUSTRIES, INC.